# Patient Record
Sex: FEMALE | Race: ASIAN | NOT HISPANIC OR LATINO | Employment: UNEMPLOYED | URBAN - METROPOLITAN AREA
[De-identification: names, ages, dates, MRNs, and addresses within clinical notes are randomized per-mention and may not be internally consistent; named-entity substitution may affect disease eponyms.]

---

## 2022-06-26 ENCOUNTER — ANESTHESIA (OUTPATIENT)
Dept: SURGERY | Facility: HOSPITAL | Age: 57
End: 2022-06-26
Payer: COMMERCIAL

## 2022-06-26 ENCOUNTER — HOSPITAL ENCOUNTER (OUTPATIENT)
Facility: HOSPITAL | Age: 57
Discharge: HOME | End: 2022-06-27
Attending: EMERGENCY MEDICINE | Admitting: ORTHOPAEDIC SURGERY
Payer: COMMERCIAL

## 2022-06-26 ENCOUNTER — APPOINTMENT (OUTPATIENT)
Dept: RADIOLOGY | Facility: HOSPITAL | Age: 57
End: 2022-06-26
Attending: EMERGENCY MEDICINE
Payer: COMMERCIAL

## 2022-06-26 ENCOUNTER — APPOINTMENT (OUTPATIENT)
Dept: RADIOLOGY | Facility: HOSPITAL | Age: 57
End: 2022-06-26
Attending: STUDENT IN AN ORGANIZED HEALTH CARE EDUCATION/TRAINING PROGRAM
Payer: COMMERCIAL

## 2022-06-26 ENCOUNTER — ANESTHESIA EVENT (OUTPATIENT)
Dept: SURGERY | Facility: HOSPITAL | Age: 57
End: 2022-06-26
Payer: COMMERCIAL

## 2022-06-26 DIAGNOSIS — S63.266A CLOSED DISLOCATION OF FIFTH METACARPAL BONE OF RIGHT HAND: ICD-10-CM

## 2022-06-26 DIAGNOSIS — S62.141A CLOSED DISPLACED FRACTURE OF HAMATE BONE OF RIGHT WRIST, UNSPECIFIED PORTION OF HAMATE, INITIAL ENCOUNTER: ICD-10-CM

## 2022-06-26 DIAGNOSIS — S62.316A DISPLACED FRACTURE OF BASE OF FIFTH METACARPAL BONE, RIGHT HAND, INITIAL ENCOUNTER FOR CLOSED FRACTURE: ICD-10-CM

## 2022-06-26 LAB
ANION GAP SERPL CALCULATED.3IONS-SCNC: 9 MMOL/L (ref 5–18)
BASOPHILS # BLD AUTO: 0.1 10E3/UL (ref 0–0.2)
BASOPHILS NFR BLD AUTO: 0 %
BUN SERPL-MCNC: 16 MG/DL (ref 8–22)
CALCIUM SERPL-MCNC: 9.8 MG/DL (ref 8.5–10.5)
CHLORIDE BLD-SCNC: 100 MMOL/L (ref 98–107)
CO2 SERPL-SCNC: 31 MMOL/L (ref 22–31)
CREAT SERPL-MCNC: 0.71 MG/DL (ref 0.6–1.1)
EOSINOPHIL # BLD AUTO: 0.2 10E3/UL (ref 0–0.7)
EOSINOPHIL NFR BLD AUTO: 2 %
ERYTHROCYTE [DISTWIDTH] IN BLOOD BY AUTOMATED COUNT: 12.3 % (ref 10–15)
GFR SERPL CREATININE-BSD FRML MDRD: >90 ML/MIN/1.73M2
GLUCOSE BLD-MCNC: 110 MG/DL (ref 70–125)
HCT VFR BLD AUTO: 44.1 % (ref 35–47)
HGB BLD-MCNC: 14.8 G/DL (ref 11.7–15.7)
IMM GRANULOCYTES # BLD: 0.1 10E3/UL
IMM GRANULOCYTES NFR BLD: 0 %
LYMPHOCYTES # BLD AUTO: 2 10E3/UL (ref 0.8–5.3)
LYMPHOCYTES NFR BLD AUTO: 13 %
MCH RBC QN AUTO: 30.6 PG (ref 26.5–33)
MCHC RBC AUTO-ENTMCNC: 33.6 G/DL (ref 31.5–36.5)
MCV RBC AUTO: 91 FL (ref 78–100)
MONOCYTES # BLD AUTO: 0.9 10E3/UL (ref 0–1.3)
MONOCYTES NFR BLD AUTO: 6 %
NEUTROPHILS # BLD AUTO: 11.9 10E3/UL (ref 1.6–8.3)
NEUTROPHILS NFR BLD AUTO: 79 %
NRBC # BLD AUTO: 0 10E3/UL
NRBC BLD AUTO-RTO: 0 /100
PLATELET # BLD AUTO: 307 10E3/UL (ref 150–450)
POTASSIUM BLD-SCNC: 2.7 MMOL/L (ref 3.5–5)
RBC # BLD AUTO: 4.83 10E6/UL (ref 3.8–5.2)
SARS-COV-2 RNA RESP QL NAA+PROBE: NEGATIVE
SODIUM SERPL-SCNC: 140 MMOL/L (ref 136–145)
WBC # BLD AUTO: 15.1 10E3/UL (ref 4–11)

## 2022-06-26 PROCEDURE — 258N000003 HC RX IP 258 OP 636: Performed by: EMERGENCY MEDICINE

## 2022-06-26 PROCEDURE — 85025 COMPLETE CBC W/AUTO DIFF WBC: CPT | Performed by: EMERGENCY MEDICINE

## 2022-06-26 PROCEDURE — 250N000011 HC RX IP 250 OP 636: Performed by: EMERGENCY MEDICINE

## 2022-06-26 PROCEDURE — 93005 ELECTROCARDIOGRAM TRACING: CPT | Performed by: EMERGENCY MEDICINE

## 2022-06-26 PROCEDURE — 29125 APPL SHORT ARM SPLINT STATIC: CPT | Mod: RT

## 2022-06-26 PROCEDURE — 73130 X-RAY EXAM OF HAND: CPT | Mod: RT

## 2022-06-26 PROCEDURE — 99285 EMERGENCY DEPT VISIT HI MDM: CPT | Mod: 25

## 2022-06-26 PROCEDURE — 87635 SARS-COV-2 COVID-19 AMP PRB: CPT | Performed by: EMERGENCY MEDICINE

## 2022-06-26 PROCEDURE — C9803 HOPD COVID-19 SPEC COLLECT: HCPCS

## 2022-06-26 PROCEDURE — 999N000065 XR HAND RT G/E 3 VW

## 2022-06-26 PROCEDURE — 250N000013 HC RX MED GY IP 250 OP 250 PS 637: Performed by: EMERGENCY MEDICINE

## 2022-06-26 PROCEDURE — 36415 COLL VENOUS BLD VENIPUNCTURE: CPT | Performed by: EMERGENCY MEDICINE

## 2022-06-26 PROCEDURE — 80048 BASIC METABOLIC PNL TOTAL CA: CPT | Performed by: EMERGENCY MEDICINE

## 2022-06-26 RX ORDER — HYDROCODONE BITARTRATE AND ACETAMINOPHEN 5; 325 MG/1; MG/1
1-2 TABLET ORAL EVERY 4 HOURS PRN
Qty: 18 TABLET | Refills: 0 | Status: SHIPPED | OUTPATIENT
Start: 2022-06-26 | End: 2022-06-29

## 2022-06-26 RX ORDER — ONDANSETRON 4 MG/1
4 TABLET, ORALLY DISINTEGRATING ORAL ONCE
Status: COMPLETED | OUTPATIENT
Start: 2022-06-26 | End: 2022-06-26

## 2022-06-26 RX ORDER — LIDOCAINE 40 MG/G
CREAM TOPICAL
Status: DISCONTINUED | OUTPATIENT
Start: 2022-06-26 | End: 2022-06-26 | Stop reason: HOSPADM

## 2022-06-26 RX ORDER — HYDROCODONE BITARTRATE AND ACETAMINOPHEN 5; 325 MG/1; MG/1
1 TABLET ORAL ONCE
Status: COMPLETED | OUTPATIENT
Start: 2022-06-26 | End: 2022-06-26

## 2022-06-26 RX ORDER — POTASSIUM CHLORIDE 20MEQ/15ML
40 LIQUID (ML) ORAL ONCE
Status: COMPLETED | OUTPATIENT
Start: 2022-06-26 | End: 2022-06-26

## 2022-06-26 RX ORDER — SODIUM CHLORIDE, SODIUM LACTATE, POTASSIUM CHLORIDE, CALCIUM CHLORIDE 600; 310; 30; 20 MG/100ML; MG/100ML; MG/100ML; MG/100ML
INJECTION, SOLUTION INTRAVENOUS CONTINUOUS
Status: DISCONTINUED | OUTPATIENT
Start: 2022-06-26 | End: 2022-06-26 | Stop reason: HOSPADM

## 2022-06-26 RX ORDER — FENTANYL CITRATE 50 UG/ML
50 INJECTION, SOLUTION INTRAMUSCULAR; INTRAVENOUS
Status: DISCONTINUED | OUTPATIENT
Start: 2022-06-26 | End: 2022-06-26 | Stop reason: HOSPADM

## 2022-06-26 RX ORDER — POTASSIUM CHLORIDE 1.5 G/1.58G
40 POWDER, FOR SOLUTION ORAL ONCE
Status: COMPLETED | OUTPATIENT
Start: 2022-06-26 | End: 2022-06-26

## 2022-06-26 RX ORDER — SODIUM CHLORIDE 9 MG/ML
INJECTION, SOLUTION INTRAVENOUS CONTINUOUS
Status: DISCONTINUED | OUTPATIENT
Start: 2022-06-26 | End: 2022-06-27 | Stop reason: HOSPADM

## 2022-06-26 RX ADMIN — SODIUM CHLORIDE: 9 INJECTION, SOLUTION INTRAVENOUS at 23:47

## 2022-06-26 RX ADMIN — ONDANSETRON 4 MG: 4 TABLET, ORALLY DISINTEGRATING ORAL at 19:07

## 2022-06-26 RX ADMIN — POTASSIUM CHLORIDE 40 MEQ: 1.5 POWDER, FOR SOLUTION ORAL at 23:47

## 2022-06-26 RX ADMIN — POTASSIUM CHLORIDE 40 MEQ: 20 SOLUTION ORAL at 20:55

## 2022-06-26 RX ADMIN — HYDROCODONE BITARTRATE AND ACETAMINOPHEN 1 TABLET: 5; 325 TABLET ORAL at 19:07

## 2022-06-26 NOTE — ED PROVIDER NOTES
EMERGENCY DEPARTMENT ENCOUnter      NAME: Kike Kong  AGE: 57 year old female  YOB: 1965  MRN: [id number]  EVALUATION DATE & TIME: 6/26/2022  5:51 PM    PCP: No primary care provider on file.    ED PROVIDER: Geeta Devi MD      Chief Complaint   Patient presents with     Hand Injury         FINAL IMPRESSION:  1. Displaced fracture of base of fifth metacarpal bone, right hand, initial encounter for closed fracture    2. Closed displaced fracture of hamate bone of right wrist, unspecified portion of hamate, initial encounter    3. Closed dislocation of fifth metacarpal bone of right hand          ED COURSE & MEDICAL DECISION MAKING:      In summary, the patient is a 57-year-old female that presents to the emergency department for evaluation of a right hand injury from a fall.  She is noted to have fracture/dislocation of the CMC joint of the 5th metacarpal.  Is also noted to have a hamate fracture.  Reduction was attempted in the emergency department and was not successful.  Orthopedic consultation was performed and she thought that the patient will require operative management of her fracture dislocation of her fifth metacarpal dislocation and fracture.    Vicodin 1 tab po administered for pain.  Zofran 4 mg ODT was administered for nausea.  Reduction was attempted with splint application and was not successful.  Reevaluation reveals pain much improved after splint and vicodin.  1930-Signed out at change of shift to Dr. Quevedo with operative repair with Dr. Farmer pending.  Screening labs and ekg are also pending.      At the conclusion of the encounter I discussed the results of all of the tests and the disposition. The questions were answered. The patient or family acknowledged understanding and was agreeable with the care plan.         MEDICATIONS GIVEN IN THE EMERGENCY:  Medications   ondansetron (ZOFRAN ODT) ODT tab 4 mg (4 mg Oral Given 6/26/22 1907)   HYDROcodone-acetaminophen (NORCO)  5-325 MG per tablet 1 tablet (1 tablet Oral Given 6/26/22 1907)       NEW PRESCRIPTIONS STARTED AT TODAY'S ER VISIT  New Prescriptions    No medications on file          =================================================================    HPI    The patient is a 57-year-old female that presents to the emergency department for evaluation of a right hand injury.  The patient fell landing on her right hand.  She describes her pain as sharp, constant, 8 out of 10 in intensity exacerbated with any movement and not relieved with any particular activity.  She denies any numbness tingling or weakness.  Denies any other injury.  She denies striking her head or neck pain.  She is right-handed.  She last ate strawberries and tofu at 1500.    REVIEW OF SYSTEMS     Constitutional:  Denies fever or chills  HENT:  Denies sore throat   Respiratory:  Denies cough or shortness of breath   Cardiovascular:  Denies chest pain or palpitations  GI:  Denies abdominal pain, nausea, or vomiting  Musculoskeletal: right hand pain  Skin:  Denies rash   Neurologic:  Denies headache, focal weakness or sensory changes    All other systems reviewed and are negative      PAST MEDICAL HISTORY:  Reviewed and nothing pertinent  PAST SURGICAL HISTORY:  Reviewed and nothing pertinent      CURRENT MEDICATIONS:    No current outpatient medications on file.      ALLERGIES:  No Known Allergies    FAMILY HISTORY:  No family history on file.    SOCIAL HISTORY:   Social History     Socioeconomic History     Marital status:        VITALS:  Patient Vitals for the past 24 hrs:   BP Temp Temp src Pulse Resp SpO2 Weight   06/26/22 1748 (!) 140/89 97.8  F (36.6  C) Temporal 80 15 100 % 59 kg (130 lb)       PHYSICAL EXAM    Constitutional:  Well developed, Well nourished,  HENT:  Normocephalic, Atraumatic, Bilateral external ears normal, Oropharynx moist, Nose normal.   Neck:  Normal range of motion, No meningismus, No stridor.   Eyes:  EOMI, Conjunctiva  normal, No discharge.   Respiratory:  Normal breath sounds, No respiratory distress, No wheezing, No chest tenderness.   Cardiovascular:  Normal heart rate, Normal rhythm, No murmurs  GI:  Soft, No tenderness, No guarding, No CVA tenderness.   Musculoskeletal:  Neurovascularly intact distally, right ulnar hand mildly edematous and tender to palpation  Integument:  Warm, Dry, No erythema, No rash.   Lymphatic:  No lymphadenopathy noted.   Neurologic:  Alert & oriented x 3, Normal motor function, Normal sensory function, No focal deficits noted.   Psychiatric:  Affect normal, Judgment normal, Mood normal.      LAB:  All pertinent labs reviewed and interpreted.  Results for orders placed or performed during the hospital encounter of 06/26/22   XR Hand Right G/E 3 Views    Impression    IMPRESSION: Acute fractures of the right fifth metacarpal and hamate. There is a nondisplaced, minimally impacted transverse fracture of the distal metaphysis of the right fifth metacarpal (boxer's fracture). There are additional fractures and   malalignment at the fifth CMC joint, including a small, 5 x 2 mm fracture arising from the volar base of the fifth metatarsal, and an additional small, 4 x 2 mm fracture arising from the dorsal hamate. The base of the fifth metacarpal is   subluxed/dislocated dorsally. Moderate soft tissue swelling in the ulnar aspect of the hand. No additional fracture identified. Normal joint alignment elsewhere. Minimal degenerative changes in the fingers and thumb.   XR Hand Right G/E 3 Views    Impression    IMPRESSION: Persistent dislocation of the fracture-dislocation of the fifth CMC joint. New splint in place. Small fracture fragments arising from the base of the metacarpal and the hamate are again noted and do not appear significantly changed. The   nondisplaced fifth metacarpal boxer's fracture is again visualized without significant change. There is a lucency overlying the third metacarpal head which  I believe is a summation shadow. No new bone or joint abnormality.       RADIOLOGY:  I have independently reviewed and interpreted the above imaging, pending the final radiology read.  XR Hand Right G/E 3 Views   Final Result   IMPRESSION: Persistent dislocation of the fracture-dislocation of the fifth CMC joint. New splint in place. Small fracture fragments arising from the base of the metacarpal and the hamate are again noted and do not appear significantly changed. The    nondisplaced fifth metacarpal boxer's fracture is again visualized without significant change. There is a lucency overlying the third metacarpal head which I believe is a summation shadow. No new bone or joint abnormality.      XR Hand Right G/E 3 Views   Final Result   IMPRESSION: Acute fractures of the right fifth metacarpal and hamate. There is a nondisplaced, minimally impacted transverse fracture of the distal metaphysis of the right fifth metacarpal (boxer's fracture). There are additional fractures and    malalignment at the fifth CMC joint, including a small, 5 x 2 mm fracture arising from the volar base of the fifth metatarsal, and an additional small, 4 x 2 mm fracture arising from the dorsal hamate. The base of the fifth metacarpal is    subluxed/dislocated dorsally. Moderate soft tissue swelling in the ulnar aspect of the hand. No additional fracture identified. Normal joint alignment elsewhere. Minimal degenerative changes in the fingers and thumb.        Procedure    PROCEDURE: Splint Placement   INDICATIONS: Hand fracture/dislocation   PROCEDURE PROVIDER: Dr Geeta Devi   NOTE:  A Ulnar gutter splint made of Orthoglass was applied to the Right upper extremity by the above provider. As noted in the physical exam, distal CMS was intact prior to placement. The splint was checked and the fit was adjusted to ensure proper positioning after placement. Sensation and circulation, as well as motor function, are unchanged after  splint placement and the patient is more comfortable with the splint in place.       Geeta Devi MD  Emergency Medicine  [facility] EMERGENCY DEPARTMENT  [facility] [facility] [address] [phone]  Dept: [phone]       Geeta Devi MD  06/26/22 1938

## 2022-06-26 NOTE — Clinical Note
Dr. Farmer to take to the OR approx 2100.    Report called to Melissa in OR  Transport arrived and transported patient to OR via WC.   and belongings are with patient.

## 2022-06-26 NOTE — ED TRIAGE NOTES
Patient states had a trip and fall- causing her to fall forwards and struck the dorsum on her right hand.  Now having increased pain and swelling to area.  Landed on her knees but denies any pain there or any other injuries besides her hand.

## 2022-06-27 ENCOUNTER — ANCILLARY PROCEDURE (OUTPATIENT)
Dept: ULTRASOUND IMAGING | Facility: HOSPITAL | Age: 57
End: 2022-06-27
Attending: ANESTHESIOLOGY
Payer: COMMERCIAL

## 2022-06-27 VITALS
RESPIRATION RATE: 17 BRPM | DIASTOLIC BLOOD PRESSURE: 68 MMHG | SYSTOLIC BLOOD PRESSURE: 119 MMHG | WEIGHT: 130 LBS | BODY MASS INDEX: 25.52 KG/M2 | HEART RATE: 69 BPM | OXYGEN SATURATION: 97 % | TEMPERATURE: 97.5 F | HEIGHT: 60 IN

## 2022-06-27 LAB
ATRIAL RATE - MUSE: 79 BPM
DIASTOLIC BLOOD PRESSURE - MUSE: NORMAL MMHG
HOLD SPECIMEN: NORMAL
INTERPRETATION ECG - MUSE: NORMAL
P AXIS - MUSE: 55 DEGREES
POTASSIUM BLD-SCNC: 3.8 MMOL/L (ref 3.5–5)
PR INTERVAL - MUSE: 178 MS
QRS DURATION - MUSE: 96 MS
QT - MUSE: 418 MS
QTC - MUSE: 479 MS
R AXIS - MUSE: 9 DEGREES
SYSTOLIC BLOOD PRESSURE - MUSE: NORMAL MMHG
T AXIS - MUSE: 31 DEGREES
VENTRICULAR RATE- MUSE: 79 BPM

## 2022-06-27 PROCEDURE — 84132 ASSAY OF SERUM POTASSIUM: CPT | Performed by: EMERGENCY MEDICINE

## 2022-06-27 PROCEDURE — 999N000141 HC STATISTIC PRE-PROCEDURE NURSING ASSESSMENT: Performed by: ORTHOPAEDIC SURGERY

## 2022-06-27 PROCEDURE — 250N000009 HC RX 250

## 2022-06-27 PROCEDURE — 36415 COLL VENOUS BLD VENIPUNCTURE: CPT | Performed by: EMERGENCY MEDICINE

## 2022-06-27 PROCEDURE — 258N000003 HC RX IP 258 OP 636: Performed by: ANESTHESIOLOGY

## 2022-06-27 PROCEDURE — C1713 ANCHOR/SCREW BN/BN,TIS/BN: HCPCS | Performed by: ORTHOPAEDIC SURGERY

## 2022-06-27 PROCEDURE — 360N000075 HC SURGERY LEVEL 2, PER MIN: Performed by: ORTHOPAEDIC SURGERY

## 2022-06-27 PROCEDURE — 710N000012 HC RECOVERY PHASE 2, PER MINUTE: Performed by: ORTHOPAEDIC SURGERY

## 2022-06-27 PROCEDURE — 272N000001 HC OR GENERAL SUPPLY STERILE: Performed by: ORTHOPAEDIC SURGERY

## 2022-06-27 PROCEDURE — 250N000009 HC RX 250: Performed by: ANESTHESIOLOGY

## 2022-06-27 PROCEDURE — 258N000003 HC RX IP 258 OP 636: Performed by: EMERGENCY MEDICINE

## 2022-06-27 PROCEDURE — 370N000017 HC ANESTHESIA TECHNICAL FEE, PER MIN: Performed by: ORTHOPAEDIC SURGERY

## 2022-06-27 PROCEDURE — 250N000011 HC RX IP 250 OP 636: Performed by: ANESTHESIOLOGY

## 2022-06-27 DEVICE — IMPLANTABLE DEVICE: Type: IMPLANTABLE DEVICE | Site: HAND | Status: FUNCTIONAL

## 2022-06-27 RX ORDER — FENTANYL CITRATE 50 UG/ML
25 INJECTION, SOLUTION INTRAMUSCULAR; INTRAVENOUS EVERY 5 MIN PRN
Status: DISCONTINUED | OUTPATIENT
Start: 2022-06-27 | End: 2022-06-27 | Stop reason: HOSPADM

## 2022-06-27 RX ORDER — LIDOCAINE 40 MG/G
CREAM TOPICAL
Status: DISCONTINUED | OUTPATIENT
Start: 2022-06-27 | End: 2022-06-27 | Stop reason: HOSPADM

## 2022-06-27 RX ORDER — HYDROCODONE BITARTRATE AND ACETAMINOPHEN 5; 325 MG/1; MG/1
TABLET ORAL
Qty: 10 TABLET | Refills: 0 | Status: SHIPPED | OUTPATIENT
Start: 2022-06-27

## 2022-06-27 RX ORDER — ACETAMINOPHEN 325 MG/1
325-650 TABLET ORAL EVERY 6 HOURS PRN
COMMUNITY

## 2022-06-27 RX ORDER — ACETAMINOPHEN 325 MG/1
650 TABLET ORAL
Status: DISCONTINUED | OUTPATIENT
Start: 2022-06-27 | End: 2022-06-27 | Stop reason: HOSPADM

## 2022-06-27 RX ORDER — NALOXONE HYDROCHLORIDE 1 MG/ML
0.4 INJECTION INTRAMUSCULAR; INTRAVENOUS; SUBCUTANEOUS
Status: DISCONTINUED | OUTPATIENT
Start: 2022-06-27 | End: 2022-06-27 | Stop reason: HOSPADM

## 2022-06-27 RX ORDER — HYDROMORPHONE HYDROCHLORIDE 1 MG/ML
0.2 INJECTION, SOLUTION INTRAMUSCULAR; INTRAVENOUS; SUBCUTANEOUS EVERY 5 MIN PRN
Status: DISCONTINUED | OUTPATIENT
Start: 2022-06-27 | End: 2022-06-27 | Stop reason: HOSPADM

## 2022-06-27 RX ORDER — BUPIVACAINE HYDROCHLORIDE 5 MG/ML
INJECTION, SOLUTION EPIDURAL; INTRACAUDAL
Status: COMPLETED | OUTPATIENT
Start: 2022-06-27 | End: 2022-06-27

## 2022-06-27 RX ORDER — ONDANSETRON 4 MG/1
4 TABLET, ORALLY DISINTEGRATING ORAL EVERY 30 MIN PRN
Status: DISCONTINUED | OUTPATIENT
Start: 2022-06-27 | End: 2022-06-27 | Stop reason: HOSPADM

## 2022-06-27 RX ORDER — FENTANYL CITRATE 50 UG/ML
50 INJECTION, SOLUTION INTRAMUSCULAR; INTRAVENOUS
Status: DISCONTINUED | OUTPATIENT
Start: 2022-06-27 | End: 2022-06-27 | Stop reason: HOSPADM

## 2022-06-27 RX ORDER — CEFAZOLIN SODIUM 2 G/100ML
2 INJECTION, SOLUTION INTRAVENOUS
Status: DISCONTINUED | OUTPATIENT
Start: 2022-06-27 | End: 2022-06-27 | Stop reason: HOSPADM

## 2022-06-27 RX ORDER — CEFAZOLIN SODIUM/WATER 2 G/20 ML
SYRINGE (ML) INTRAVENOUS
Status: DISCONTINUED
Start: 2022-06-27 | End: 2022-06-27 | Stop reason: HOSPADM

## 2022-06-27 RX ORDER — SODIUM CHLORIDE, SODIUM LACTATE, POTASSIUM CHLORIDE, CALCIUM CHLORIDE 600; 310; 30; 20 MG/100ML; MG/100ML; MG/100ML; MG/100ML
INJECTION, SOLUTION INTRAVENOUS CONTINUOUS
Status: DISCONTINUED | OUTPATIENT
Start: 2022-06-27 | End: 2022-06-27 | Stop reason: HOSPADM

## 2022-06-27 RX ORDER — OXYCODONE HYDROCHLORIDE 5 MG/1
5 TABLET ORAL EVERY 4 HOURS PRN
Status: DISCONTINUED | OUTPATIENT
Start: 2022-06-27 | End: 2022-06-27 | Stop reason: HOSPADM

## 2022-06-27 RX ORDER — LIDOCAINE HYDROCHLORIDE 10 MG/ML
INJECTION, SOLUTION INFILTRATION; PERINEURAL PRN
Status: DISCONTINUED | OUTPATIENT
Start: 2022-06-27 | End: 2022-06-27

## 2022-06-27 RX ORDER — FENTANYL CITRATE 50 UG/ML
25 INJECTION, SOLUTION INTRAMUSCULAR; INTRAVENOUS
Status: DISCONTINUED | OUTPATIENT
Start: 2022-06-27 | End: 2022-06-27 | Stop reason: HOSPADM

## 2022-06-27 RX ORDER — NALOXONE HYDROCHLORIDE 1 MG/ML
0.2 INJECTION INTRAMUSCULAR; INTRAVENOUS; SUBCUTANEOUS
Status: DISCONTINUED | OUTPATIENT
Start: 2022-06-27 | End: 2022-06-27 | Stop reason: HOSPADM

## 2022-06-27 RX ORDER — ONDANSETRON 2 MG/ML
4 INJECTION INTRAMUSCULAR; INTRAVENOUS EVERY 30 MIN PRN
Status: DISCONTINUED | OUTPATIENT
Start: 2022-06-27 | End: 2022-06-27 | Stop reason: HOSPADM

## 2022-06-27 RX ORDER — IBUPROFEN 600 MG/1
600 TABLET, FILM COATED ORAL EVERY 6 HOURS PRN
Qty: 30 TABLET | Refills: 0 | Status: SHIPPED | OUTPATIENT
Start: 2022-06-27

## 2022-06-27 RX ORDER — PROPOFOL 10 MG/ML
INJECTION, EMULSION INTRAVENOUS CONTINUOUS PRN
Status: DISCONTINUED | OUTPATIENT
Start: 2022-06-27 | End: 2022-06-27

## 2022-06-27 RX ORDER — CEFAZOLIN SODIUM 2 G/100ML
2 INJECTION, SOLUTION INTRAVENOUS SEE ADMIN INSTRUCTIONS
Status: DISCONTINUED | OUTPATIENT
Start: 2022-06-27 | End: 2022-06-27 | Stop reason: HOSPADM

## 2022-06-27 RX ADMIN — BUPIVACAINE HYDROCHLORIDE 20 ML: 5 INJECTION, SOLUTION EPIDURAL; INTRACAUDAL; PERINEURAL at 11:00

## 2022-06-27 RX ADMIN — SODIUM CHLORIDE, POTASSIUM CHLORIDE, SODIUM LACTATE AND CALCIUM CHLORIDE: 600; 310; 30; 20 INJECTION, SOLUTION INTRAVENOUS at 16:25

## 2022-06-27 RX ADMIN — MIDAZOLAM HYDROCHLORIDE 2 MG: 1 INJECTION, SOLUTION INTRAMUSCULAR; INTRAVENOUS at 11:03

## 2022-06-27 RX ADMIN — FENTANYL CITRATE 50 MCG: 50 INJECTION, SOLUTION INTRAMUSCULAR; INTRAVENOUS at 11:11

## 2022-06-27 RX ADMIN — SODIUM CHLORIDE: 9 INJECTION, SOLUTION INTRAVENOUS at 07:55

## 2022-06-27 RX ADMIN — PROPOFOL 125 MCG/KG/MIN: 10 INJECTION, EMULSION INTRAVENOUS at 15:56

## 2022-06-27 RX ADMIN — PROPOFOL 30 MG: 10 INJECTION, EMULSION INTRAVENOUS at 15:57

## 2022-06-27 RX ADMIN — PHENYLEPHRINE HYDROCHLORIDE 50 MCG: 10 INJECTION INTRAVENOUS at 16:21

## 2022-06-27 RX ADMIN — LIDOCAINE HYDROCHLORIDE 3 ML: 10 INJECTION, SOLUTION INFILTRATION; PERINEURAL at 15:55

## 2022-06-27 RX ADMIN — SODIUM CHLORIDE, POTASSIUM CHLORIDE, SODIUM LACTATE AND CALCIUM CHLORIDE: 600; 310; 30; 20 INJECTION, SOLUTION INTRAVENOUS at 11:12

## 2022-06-27 RX ADMIN — CEFAZOLIN SODIUM 2 G: 2 INJECTION, SOLUTION INTRAVENOUS at 15:55

## 2022-06-27 NOTE — ED PROVIDER NOTES
8:29 PM.  Received a call from lab.  Potassium is quite low at 2.7.  Supplemental potassium ordered.  White cell count mildly elevated 15.1.  Otherwise laboratory work is normal.  11:10 PM.  Patient surgery canceled due to hypokalemia.  Discussed circumstances at length with surgery.  Request patient remain in the emergency room tonight with potassium supplementation.  N.p.o. at midnight.  We will plan for surgery in the morning.  An additional 40 mEq of oral potassium given.  Curtis Chaudhary MD  06/26/22 2029       Curtis Chaudhary MD  06/26/22 4524

## 2022-06-27 NOTE — OP NOTE
"Date of Procedure: 6/27/2022    Surgeon: Jessica Farmer M.D.    Assistant: Kimberly Gould PA-C PA-C assist required for patient positioning, soft tissue retraction, instrumentation assist, and patient's safety.    Preoperative diagnosis:   1.  Right fifth metacarpal fracture and hamate rim fracture with dorsal dislocation of the right fifth metacarpal at the fifth CMC joint.  2.  Nondisplaced right fifth metacarpal neck fracture.    Postoperative diagnosis:    1.  Right fifth metacarpal fracture and hamate rim fracture with dorsal dislocation of the right fifth metacarpal at the fifth CMC joint.  2.  Nondisplaced right fifth metacarpal neck fracture.    Operation/procedures performed:   1.  Closed reduction percutaneous pinning of right fifth CMC joint.  2.  Nonoperative management of nondisplaced right fifth metacarpal neck fracture.    Sedation/anesthesia: Regional block, right upper extremity with general anesthesia    Complications: None    Drains: None    Estimated blood loss: Minimal    Implants: Two 0.054\" K wires.    Specimens: None.    Indications for Surgery: The patient is a pleasant 57-year-old right-hand-dominant woman who fell on 6/26/2022 sustaining an injury to her right hand.  She presented to the ER where radiographs demonstrated a nondisplaced fifth metacarpal fracture and fractures at the fifth CMC joint including the hamate and fifth metacarpal base and fifth CMC joint dorsal dislocation.  Based on patient's history, clinical exam, radiographic findings, I recommended proceeding with surgical intervention to reduce the fifth CMC joint and pinned the joint.  The risks, benefits, and alternatives of this surgical procedure were thoroughly discussed with the patient.  I outlined the risks of surgery which included, but are not limited to: Infection, recurrence, nonunion, malunion, incisional pain, the development of scar tissue, and/or neurovascular injury. The consequences of such risks could " "include hospital admission, additional surgery, chronic pain, loss of strength, loss of sensation, loss of motion and/or loss of function. I explained that although these risks are low they are real. The patient then had opportunity to ask questions which I answered. After thorough discussion, the patient verbalized understanding and stated that they wished proceed with operative intervention.    Tourniquet time: Per anesthesia record    Disposition: Recovery room in good condition    Operation description: The patient was identified, informed consent was obtained, and the surgical site was marked in the holding area.  A right regional block was performed by the anesthesiologist.  The patient was then brought to the operating room and positioned supine with the right upper extremity on an arm table.    The right upper extremity was prepped and draped in standard surgical fashion.  This included placing well-padded tourniquet on the upper arm.  Prior to beginning the case a \"timeout\" was performed by the surgical team to confirm surgical site, side, and procedure.    Under fluoroscopic guidance, I attempted to reduce the fifth CMC joint.  I was able to get an excellent reduction of the fracture and the dislocation by applying dorsal and radial fracture to the base of the fifth metacarpal.  While holding the fifth CMC joint reduced, I placed a 0.054\" K wire from the ulnar base of the fifth metacarpal and across the fourth and third metacarpal bases.  I placed a second 0.054\" K wire from the base of the third metacarpal across the fifth CMC joint and into the capitate.    After placing 2 K wires, I took radiographs confirming that the K wires were in bone and that the joint remained reduced.  I took radiographs in the AP, lateral, and oblique planes.  I was happy with all these parameters.  I then cut the K wires short beneath the skin.  We took final radiographs in the AP, lateral, and oblique planes.    The pin sites " were then covered with Xeroform and a sterile dressing.  The patient was then placed into an ulnar gutter splint.  She was then awoken from sedation and brought to the recovery room in good condition.  There were no complications.    POSTOPERATIVE PLAN: The patient can be discharged home.  She will be given a prescription for Norco 5/325 mg with instructions to take 1 tablet every 4 hours as needed for pain.  I would like to see the patient back in 7 to 10 days.  The plan will be to transition the patient to a ulnar gutter cast at that visit.  In approximately 3 weeks postop, we will transition patient to a removable ulnar gutter splint.  The plan will be to take her back to the operating room in 6 weeks for pin removal.

## 2022-06-27 NOTE — ANESTHESIA CARE TRANSFER NOTE
Patient: Kike Kong    Procedure: Procedure(s):  CLOSED REDUCTION, FRACTURE, HAND, WITH PERCUTANEOUS PINNING       Diagnosis: Displaced fracture of base of fifth metacarpal bone, right hand, initial encounter for closed fracture [S62.316A]  Diagnosis Additional Information: No value filed.    Anesthesia Type:   General     Note:    Oropharynx: oropharynx clear of all foreign objects  Level of Consciousness: awake  Oxygen Supplementation: room air    Independent Airway: airway patency satisfactory and stable  Dentition: dentition unchanged  Vital Signs Stable: post-procedure vital signs reviewed and stable  Report to RN Given: handoff report given  Patient transferred to: Phase II    Handoff Report: Identifed the Patient, Identified the Reponsible Provider, Reviewed the pertinent medical history, Discussed the surgical course, Reviewed Intra-OP anesthesia mangement and issues during anesthesia, Set expectations for post-procedure period and Allowed opportunity for questions and acknowledgement of understanding      Vitals:  Vitals Value Taken Time   /76 06/27/22 1645   Temp 36.7  C (98  F) 06/27/22 1645   Pulse 69 06/27/22 1645   Resp 18 06/27/22 1645   SpO2 97 % 06/27/22 1645       Electronically Signed By: RAMESH Rodríguez CRNA  June 27, 2022  4:50 PM

## 2022-06-27 NOTE — H&P
[facility] History and Physical    Kike Kong MRN# [id number]   Age: 57 year old YOB: 1965     Date of Admission:  6/26/2022    Primary care provider: Shannon Baldwin          Assessment and Plan:   Assessment:   Right fifth CMC joint fracture dislocation, DOI: 6/26/2022      Plan:   The radiographs were reviewed and I discussed the findings with the patient and her .  Based on the patient's history, clinical exam, functional status, and radiographic findings, I have recommended proceeding with surgery to reduce the CMC joint and pin the joint.  I recommended doing close reduction percutaneous pinning.  However, if I am unable to get a good reduction, I have recommended proceeding with open reduction and pinning.  The risks, benefits, and alternatives of surgical intervention were thoroughly discussed with the patient and her .  Risks of surgery include but are not limited to the risks of anesthesia, infection, neurovascular injury, nonunion, nonunion, and/or the development of scar tissue.  Consequences of such adverse events may result in loss of life, loss of limb, the need for additional surgery, chronic pain, loss of motion and/or loss of strength.  The patient had an opportunity to ask questions which I answered.  They verbalized understanding and agreed with the plan as outlined above.               Chief Complaint:   Right hand pain and swelling status post fall.     History is obtained from the patient         History of Present Illness:   This patient is a 57 year old female who presented to the emergency room on 6/26/2022 for evaluation of a right hand injury.  The patient stepped on an unstable patio stone and fell landing on her right hand.  She had immediate pain and swelling in her right hand.  Her right hand pain the was exacerbated with any movement.  She denies any numbness tingling or weakness.  Denies any other injury.  She is right-handed.             Past Medical History:   This patient has no significant past medical history         Past Surgical History:   This patient has no significant past surgical history         Social History:     Social History     Tobacco Use     Smoking status: Never Smoker     Smokeless tobacco: Never Used   Substance Use Topics     Alcohol use: Not on file            Family History:   History reviewed. No pertinent family history.    Family history reviewed and updated in [facility]         Immunizations:     Immunization History   Administered Date(s) Administered     COVID-19,PF,Moderna 04/13/2021, 05/08/2021            Allergies:   No Known Allergies         Medications:   (Not in a hospital admission)            Review of Systems:   The Review of Systems is negative other than noted in the HPI         Physical Exam:   Temp: 98.2  F (36.8  C) Temp src: Oral BP: 124/74 Pulse: 82   Resp: 18 SpO2: 96 % O2 Device: None (Room air) Oxygen Delivery: 4 LPM  All vitals have been reviewed  There is swelling of the right hand.  There is prominence at the fifth CMC joint.  The fifth CMC joint is unstable to palpation.  There is tenderness palpation over the fifth metacarpal head as well as the fifth CMC joint.  The patient has diminished range of motion of her right small finger secondary to pain and swelling.  The patient has good radial pulses.  There is good cap refill distally.  The patient has normal sensation at the tips of her fingers in the ulnar, median, and radial nerve distributions.         Data:   All laboratory and imaging data in the past 24 hours reviewed  Lab Results   Component Value Date    WBC 15.1 (H) 06/26/2022    HGB 14.8 06/26/2022    HCT 44.1 06/26/2022     06/26/2022     06/26/2022    POTASSIUM 3.8 06/27/2022    CHLORIDE 100 06/26/2022    CO2 31 06/26/2022    BUN 16 06/26/2022    CR 0.71 06/26/2022     06/26/2022     EXAM: XR HAND RT G/E 3 VW  LOCATION: [facility]  DATE/TIME:  6/26/2022 6:56 PM     INDICATION: Postreduction evaluation.  COMPARISON: Hand radiographs, same date..                                                                      IMPRESSION: Persistent dislocation of the fracture-dislocation of the fifth CMC joint. New splint in place. Small fracture fragments arising from the base of the metacarpal and the hamate are again noted and do not appear significantly changed. The   nondisplaced fifth metacarpal boxer's fracture is again visualized without significant change. There is a lucency overlying the third metacarpal head which I believe is a summation shadow. No new bone or joint abnormality.     Attestation:  I have reviewed today's vital signs, notes, medications, labs and imaging.    Jessica Farmer MD

## 2022-06-27 NOTE — ED NOTES
Pt. Left floor to surgery at 1010, family with pt., belongings sent with, Pt. Rating pain 2-3/10 denies any need for medication intervention, up to bathroom x1.

## 2022-06-27 NOTE — PHARMACY-ADMISSION MEDICATION HISTORY
Pharmacy Note - Admission Medication History     ______________________________________________________________________    Prior To Admission (PTA) med list completed and updated in EMR.       PTA Med List   Medication Sig Last Dose     acetaminophen (TYLENOL) 325 MG tablet Take 325-650 mg by mouth every 6 hours as needed for mild pain Past Week     HYDROcodone-acetaminophen (NORCO) 5-325 MG tablet Take 1-2 tablets by mouth every 4 hours as needed for pain      metFORMIN (GLUCOPHAGE) 500 MG tablet Take 500 mg by mouth daily (with breakfast) 6/25/2022       Information source(s): Patient  Method of interview communication: in-person    Summary of Changes to PTA Med List: no previous information on file    Patient was asked about OTC/herbal products specifically.  PTA med list reflects this.    In the past week, patient estimated taking medication this percent of the time:  greater than 90%.    Allergies were reviewed, assessed, and updated with the patient.      Patient does not use any multi-dose medications prior to admission.    The information provided in this note is only as accurate as the sources available at the time of the update(s).    Thank you for the opportunity to participate in the care of this patient.    Suyapa Pruitt RPH  6/27/2022 10:44 AM

## 2022-06-27 NOTE — DISCHARGE INSTRUCTIONS
Rest, ice, and elevate your right hand as much as possible over the next couple days  Please call [facility] to schedule a surgery with Dr. Farmer for repair of your broken hand and dislocation  You can remove the sling to sleep or bathe.  Must keep the splint in place until Dr. Farmer advises you otherwise

## 2022-06-27 NOTE — OR NURSING
Surgery cancelled by Dr. Morrow in pre-op (low potassium).  Dr. Farmer aware.  Patient and  verbalized understanding of plan. Denies pain at this time and resting quietly. Will be transferred back to ED holding area.    oral

## 2022-06-27 NOTE — ANESTHESIA PREPROCEDURE EVALUATION
Anesthesia Pre-Procedure Evaluation    Patient: Kike Kong   MRN: [id number] : 1965        Procedure : Procedure(s):  CLOSED REDUCTION, FRACTURE, HAND, WITH PERCUTANEOUS PINNING  OPEN REDUCTION INTERNAL FIXATION, FRACTURE, HAND          History reviewed. No pertinent past medical history.   History reviewed. No pertinent surgical history.   No Known Allergies   Social History     Tobacco Use     Smoking status: Never Smoker     Smokeless tobacco: Never Used   Substance Use Topics     Alcohol use: Not on file      Wt Readings from Last 1 Encounters:   22 59 kg (130 lb)        Anesthesia Evaluation   Pt has had prior anesthetic.     No history of anesthetic complications       ROS/MED HX  ENT/Pulmonary:  - neg pulmonary ROS     Neurologic:  - neg neurologic ROS     Cardiovascular:  - neg cardiovascular ROS     METS/Exercise Tolerance: >4 METS    Hematologic:  - neg hematologic  ROS     Musculoskeletal: Comment: Right hand, fracture-dislocation of the fifth CMC joint      GI/Hepatic:  - neg GI/hepatic ROS     Renal/Genitourinary: Comment: Hypokalemia, K 2.7.        Endo:  - neg endo ROS     Psychiatric/Substance Use:       Infectious Disease:       Malignancy:       Other:            Physical Exam    Airway        Mallampati: II   TM distance: > 3 FB   Neck ROM: full   Mouth opening: > 3 cm    Respiratory Devices and Support         Dental       (+) missing    B=Bridge, C=Chipped, L=Loose, M=Missing    Cardiovascular   cardiovascular exam normal          Pulmonary   pulmonary exam normal                OUTSIDE LABS:  CBC:   Lab Results   Component Value Date    WBC 15.1 (H) 2022    HGB 14.8 2022    HCT 44.1 2022     2022     BMP:   Lab Results   Component Value Date     2022    POTASSIUM 2.7 (LL) 2022    CHLORIDE 100 2022    CO2 31 2022    BUN 16 2022    CR 0.71 2022     2022     COAGS: No results found for: PTT, INR,  FIBR  POC: No results found for: BGM, HCG, HCGS  HEPATIC: No results found for: ALBUMIN, PROTTOTAL, ALT, AST, GGT, ALKPHOS, BILITOTAL, BILIDIRECT, MARTIN  OTHER:   Lab Results   Component Value Date    KAIDEN 9.8 06/26/2022       Anesthesia Plan    ASA Status:  2      Anesthesia Type: Peripheral Nerve Block.   Induction: Intravenous, Propofol.           Consents    Anesthesia Plan(s) and associated risks, benefits, and realistic alternatives discussed. Questions answered and patient/representative(s) expressed understanding.     - Discussed: Risks, Benefits and Alternatives for BOTH SEDATION and the PROCEDURE were discussed     - Discussed with:  Patient      - Extended Intubation/Ventilatory Support Discussed: No.      - Patient is DNR/DNI Status: No    Use of blood products discussed: No .     Postoperative Care    Pain management: Peripheral nerve block (Single Shot).   PONV prophylaxis: Ondansetron (or other 5HT-3), Dexamethasone or Solumedrol     Comments:    Other Comments: Patient was seen by Dr Morrow and Jorge and the Block was already placed by .earlier today. Patient was sent back to the floor due to surgeon's unavailability.               Marilu Morrow MD

## (undated) DEVICE — PADDING CAST 3IN WEBRIL STRL 2394

## (undated) DEVICE — CUFF TOURN 18IN STRL DISP

## (undated) DEVICE — GOWN LG DISP 9515

## (undated) DEVICE — DRSG GAUZE 4X4" 3033

## (undated) DEVICE — DRSG XEROFORM 1X8"

## (undated) DEVICE — CUSTOM PACK UPPER EXTREMITY SOP5BUEHEC

## (undated) DEVICE — GLOVE BIOGEL PI ULTRATOUCH G SZ 6.5 42165

## (undated) DEVICE — GLOVE UNDER INDICATOR PI SZ 6.5 LF 41665

## (undated) DEVICE — Device

## (undated) DEVICE — SOL NACL 0.9% IRRIG 1000ML BOTTLE 2F7124

## (undated) DEVICE — TRAY PREP DRY SKIN SCRUB 067

## (undated) RX ORDER — DEXAMETHASONE SODIUM PHOSPHATE 10 MG/ML
INJECTION INTRAMUSCULAR; INTRAVENOUS
Status: DISPENSED
Start: 2022-06-27

## (undated) RX ORDER — FENTANYL CITRATE-0.9 % NACL/PF 10 MCG/ML
PLASTIC BAG, INJECTION (ML) INTRAVENOUS
Status: DISPENSED
Start: 2022-06-27

## (undated) RX ORDER — LIDOCAINE HYDROCHLORIDE AND EPINEPHRINE 10; 10 MG/ML; UG/ML
INJECTION, SOLUTION INFILTRATION; PERINEURAL
Status: DISPENSED
Start: 2022-06-27